# Patient Record
Sex: FEMALE | Race: WHITE | NOT HISPANIC OR LATINO | Employment: FULL TIME | URBAN - NONMETROPOLITAN AREA
[De-identification: names, ages, dates, MRNs, and addresses within clinical notes are randomized per-mention and may not be internally consistent; named-entity substitution may affect disease eponyms.]

---

## 2017-01-02 ENCOUNTER — OFFICE VISIT (OUTPATIENT)
Dept: URGENT CARE | Facility: PHYSICIAN GROUP | Age: 32
End: 2017-01-02
Payer: OTHER GOVERNMENT

## 2017-01-02 VITALS
HEART RATE: 91 BPM | RESPIRATION RATE: 20 BRPM | OXYGEN SATURATION: 97 % | BODY MASS INDEX: 35.32 KG/M2 | WEIGHT: 212 LBS | HEIGHT: 65 IN | DIASTOLIC BLOOD PRESSURE: 88 MMHG | TEMPERATURE: 98.8 F | SYSTOLIC BLOOD PRESSURE: 120 MMHG

## 2017-01-02 DIAGNOSIS — H61.23 BILATERAL IMPACTED CERUMEN: ICD-10-CM

## 2017-01-02 DIAGNOSIS — J02.9 PHARYNGITIS, UNSPECIFIED ETIOLOGY: ICD-10-CM

## 2017-01-02 DIAGNOSIS — Z20.818 EXPOSURE TO STREP THROAT: ICD-10-CM

## 2017-01-02 LAB
INT CON NEG: NORMAL
INT CON POS: NORMAL
S PYO AG THROAT QL: NORMAL

## 2017-01-02 PROCEDURE — 69210 REMOVE IMPACTED EAR WAX UNI: CPT | Mod: 50 | Performed by: PHYSICIAN ASSISTANT

## 2017-01-02 PROCEDURE — 87880 STREP A ASSAY W/OPTIC: CPT | Performed by: PHYSICIAN ASSISTANT

## 2017-01-02 PROCEDURE — 99203 OFFICE O/P NEW LOW 30 MIN: CPT | Mod: 25 | Performed by: PHYSICIAN ASSISTANT

## 2017-01-02 RX ORDER — AMOXICILLIN 500 MG/1
500 CAPSULE ORAL 2 TIMES DAILY
Qty: 20 CAP | Refills: 0 | Status: SHIPPED | OUTPATIENT
Start: 2017-01-02 | End: 2017-01-12

## 2017-01-02 ASSESSMENT — ENCOUNTER SYMPTOMS
VOMITING: 0
COUGH: 0
SWOLLEN GLANDS: 1
CHILLS: 0
FEVER: 0
SORE THROAT: 1
SHORTNESS OF BREATH: 0
TROUBLE SWALLOWING: 0
STRIDOR: 0

## 2017-01-02 NOTE — PROGRESS NOTES
Subjective:      Reena Chester is a 31 y.o. female who presents with Pharyngitis            HPI Comments: Patient reports that she was recently exposed to strep and woke up this morning with a sore throat which has been worsening. She is traveling and is concerned that she may have strep. She would like to be checked today.    Pharyngitis   This is a new problem. The current episode started today. The problem has been gradually worsening. Neither side of throat is experiencing more pain than the other. There has been no fever. The pain is mild. Associated symptoms include swollen glands. Pertinent negatives include no congestion, coughing, ear discharge, ear pain, shortness of breath, stridor, trouble swallowing or vomiting. She has had exposure to strep. She has had no exposure to mono. She has tried nothing for the symptoms. The treatment provided no relief.       Review of Systems   Constitutional: Negative for fever and chills.   HENT: Positive for sore throat. Negative for congestion, ear discharge, ear pain and trouble swallowing.    Respiratory: Negative for cough, shortness of breath and stridor.    Gastrointestinal: Negative for vomiting.   Skin: Negative for rash.     Allergies:Review of patient's allergies indicates no known allergies.    Current Outpatient Prescriptions Ordered in [facility]   Medication Sig Dispense Refill   • amoxicillin (AMOXIL) 500 MG Cap Take 1 Cap by mouth 2 times a day for 10 days. 20 Cap 0   • Levothyroxine Sodium (SYNTHROID PO) Take  by mouth.       No current Epic-ordered facility-administered medications on file.       History reviewed. No pertinent past medical history.    Social History   Substance Use Topics   • Smoking status: Never Smoker    • Smokeless tobacco: Never Used   • Alcohol Use: Yes      Comment: rare       No family status information on file.   History reviewed. No pertinent family history.         Objective:     /88 mmHg  Pulse 91  Temp(Src) 37.1 °C (98.8  "°F)  Resp 20  Ht 1.651 m (5' 5\")  Wt 96.163 kg (212 lb)  BMI 35.28 kg/m2  SpO2 97%  Breastfeeding? No     Physical Exam   Constitutional: She is oriented to person, place, and time. She appears well-developed and well-nourished. No distress.   HENT:   Head: Normocephalic and atraumatic.   Right Ear: External ear normal.   Left Ear: External ear normal.   Canals completely obstructed by cerumen. After removal, canals and tympanic membranes unremarkable. Posterior pharynx mildly erythematous without edema or exudate   Eyes: Right eye exhibits no discharge. Left eye exhibits no discharge.   Neck: Normal range of motion. Neck supple.   Cardiovascular: Normal rate and regular rhythm.    Pulmonary/Chest: Effort normal and breath sounds normal.   Lymphadenopathy:     She has cervical adenopathy (anterior).   Neurological: She is alert and oriented to person, place, and time.   Skin: Skin is warm and dry. She is not diaphoretic.   Psychiatric: She has a normal mood and affect. Her behavior is normal. Judgment and thought content normal.   Nursing note and vitals reviewed.    Labs: Rapid strep negative          Assessment/Plan:     1. Pharyngitis, unspecified etiology  POCT Rapid Strep A    amoxicillin (AMOXIL) 500 MG Cap    Started today. Erythema without edema or exudate. Anterior cervical lymphadenopathy. Rapid short negative. Given contingent amoxicillin if worsening.   2. Exposure to strep throat  POCT Rapid Strep A    amoxicillin (AMOXIL) 500 MG Cap    Recent exposure to strep. Rapid strep negative. Given contingent amoxicillin if symptoms worsen   3. Bilateral impacted cerumen      Canals cleared in clinic. Given written instructions. Follow-up as needed     Procedure: Cerumen Removal  Risks and benefits of procedure discussed  Cerumen removed with curette and lavage after softening agent instilled  Patient tolerated well  Post procedure exam with clear canal and normal TM      Elsevier Interactive Patient " Education given: Pharyngitis, cerumen impaction    Please note that this dictation was created using voice recognition software. I have made every reasonable attempt to correct obvious errors, but I expect that there are errors of grammar and possibly content that I did not discover before finalizing the note.

## 2017-01-02 NOTE — MR AVS SNAPSHOT
"        Reena Chester   2017 10:50 AM   Office Visit   MRN: [id number]    Department:  [facility]   Dept Phone:  [phone]    Description:  Female : 1985   Provider:  ADELA Mcdonald           Reason for Visit     Pharyngitis x1day runny nose      Allergies as of 2017     No Known Allergies      You were diagnosed with     Pharyngitis, unspecified etiology   [6648173]   Started today. Erythema without edema or exudate. Anterior cervical lymphadenopathy. Rapid short negative. Given contingent amoxicillin if worsening.    Exposure to strep throat   [099035]   Recent exposure to strep. Rapid strep negative. Given contingent amoxicillin if symptoms worsen    Bilateral impacted cerumen   [654812]   Canals cleared in clinic. Given written instructions. Follow-up as needed      Vital Signs     Blood Pressure Pulse Temperature Respirations Height Weight    120/88 mmHg 91 37.1 °C (98.8 °F) 20 1.651 m (5' 5\") 96.163 kg (212 lb)    Body Mass Index Oxygen Saturation Breastfeeding? Smoking Status          35.28 kg/m2 97% No Never Smoker         Basic Information     Date Of Birth Sex Race Ethnicity Preferred Language    1985 Female White Non- English      Health Maintenance     Patient has no pending health maintenance at this time      Results     POCT Rapid Strep A      Component    Rapid Strep Screen    neg    Internal Control Positive    Valid    Internal Control Negative    Valid                        Current Immunizations     No immunizations on file.      Below and/or attached are the medications your provider expects you to take. Review all of your home medications and newly ordered medications with your provider and/or pharmacist. Follow medication instructions as directed by your provider and/or pharmacist. Please keep your medication list with you and share with your provider. Update the information when medications are discontinued, doses are changed, or new medications " (including over-the-counter products) are added; and carry medication information at all times in the event of emergency situations     Allergies:  No Known Allergies          Medications  Valid as of: January 02, 2017 - 11:55 AM    Generic Name Brand Name Tablet Size Instructions for use    Amoxicillin (Cap) AMOXIL 500 MG Take 1 Cap by mouth 2 times a day for 10 days.        Levothyroxine Sodium   Take  by mouth.        .                 Medicines prescribed today were sent to:     [facility] [id number] - [place], [place] - [address] [facility] [address]    [address] [place] [address]    Phone: [phone] Fax: [phone]    Open 24 Hours?: No      Medication refill instructions:       If your prescription bottle indicates you have medication refills left, it is not necessary to call your provider’s office. Please contact your pharmacy and they will refill your medication.    If your prescription bottle indicates you do not have any refills left, you may request refills at any time through one of the following ways: The online Moove In system (except Urgent Care), by calling your provider’s office, or by asking your pharmacy to contact your provider’s office with a refill request. Medication refills are processed only during regular business hours and may not be available until the next business day. Your provider may request additional information or to have a follow-up visit with you prior to refilling your medication.   *Please Note: Medication refills are assigned a new Rx number when refilled electronically. Your pharmacy may indicate that no refills were authorized even though a new prescription for the same medication is available at the pharmacy. Please request the medicine by name with the pharmacy before contacting your provider for a refill.        Instructions      Pharyngitis  Pharyngitis is redness, pain, and swelling (inflammation) of your pharynx.   CAUSES   Pharyngitis is usually  caused by infection. Most of the time, these infections are from viruses (viral) and are part of a cold. However, sometimes pharyngitis is caused by bacteria (bacterial). Pharyngitis can also be caused by allergies. Viral pharyngitis may be spread from person to person by coughing, sneezing, and personal items or utensils (cups, forks, spoons, toothbrushes). Bacterial pharyngitis may be spread from person to person by more intimate contact, such as kissing.   SIGNS AND SYMPTOMS   Symptoms of pharyngitis include:    · Sore throat.    · Tiredness (fatigue).    · Low-grade fever.    · Headache.  · Joint pain and muscle aches.  · Skin rashes.  · Swollen lymph nodes.  · Plaque-like film on throat or tonsils (often seen with bacterial pharyngitis).  DIAGNOSIS   Your health care provider will ask you questions about your illness and your symptoms. Your medical history, along with a physical exam, is often all that is needed to diagnose pharyngitis. Sometimes, a rapid strep test is done. Other lab tests may also be done, depending on the suspected cause.   TREATMENT   Viral pharyngitis will usually get better in 3-4 days without the use of medicine. Bacterial pharyngitis is treated with medicines that kill germs (antibiotics).   HOME CARE INSTRUCTIONS   · Drink enough water and fluids to keep your urine clear or pale yellow.    · Only take over-the-counter or prescription medicines as directed by your health care provider:    ¨ If you are prescribed antibiotics, make sure you finish them even if you start to feel better.    ¨ Do not take aspirin.    · Get lots of rest.    · Gargle with 8 oz of salt water (½ tsp of salt per 1 qt of water) as often as every 1-2 hours to soothe your throat.    · Throat lozenges (if you are not at risk for choking) or sprays may be used to soothe your throat.  SEEK MEDICAL CARE IF:   · You have large, tender lumps in your neck.  · You have a rash.  · You cough up green, yellow-brown, or bloody  spit.  SEEK IMMEDIATE MEDICAL CARE IF:   · Your neck becomes stiff.  · You drool or are unable to swallow liquids.  · You vomit or are unable to keep medicines or liquids down.  · You have severe pain that does not go away with the use of recommended medicines.  · You have trouble breathing (not caused by a stuffy nose).  MAKE SURE YOU:   · Understand these instructions.  · Will watch your condition.  · Will get help right away if you are not doing well or get worse.     This information is not intended to replace advice given to you by your health care provider. Make sure you discuss any questions you have with your health care provider.     Document Released: 12/18/2006 Document Revised: 10/08/2014 Document Reviewed: 08/25/2014  Aspectiva Interactive Patient Education ©2016 Aspectiva Inc.      Cerumen Impaction  The structures of the external ear canal secrete a waxy substance known as cerumen. Excess cerumen can build up in the ear canal, causing a condition known as cerumen impaction. Cerumen impaction can cause ear pain and disrupt the function of the ear.  The rate of cerumen production differs for each individual. In certain individuals, the configuration of the ear canal may decrease his or her ability to naturally remove cerumen.  CAUSES  Cerumen impaction is caused by excessive cerumen production or buildup.  RISK FACTORS  · Frequent use of swabs to clean ears.  · Having narrow ear canals.  · Having eczema.  · Being dehydrated.  SIGNS AND SYMPTOMS  · Diminished hearing.  · Ear drainage.  · Ear pain.  · Ear itch.  TREATMENT  Treatment may involve:  · Over-the-counter or prescription ear drops to soften the cerumen.  · Removal of cerumen by a health care provider. This may be done with:  · Irrigation with warm water. This is the most common method of removal.  · Ear curettes and other instruments.  · Surgery. This may be done in severe cases.  HOME CARE INSTRUCTIONS  · Take medicines only as directed by your  health care provider.  · Do not insert objects into the ear with the intent of cleaning the ear.  PREVENTION  · Do not insert objects into the ear, even with the intent of cleaning the ear. Removing cerumen as a part of normal hygiene is not necessary, and the use of swabs in the ear canal is not recommended.  · Drink enough water to keep your urine clear or pale yellow.  · Control your eczema if you have it.  SEEK MEDICAL CARE IF:  · You develop ear pain.  · You develop bleeding from the ear.  · The cerumen does not clear after you use ear drops as directed.     This information is not intended to replace advice given to you by your health care provider. Make sure you discuss any questions you have with your health care provider.     Document Released: 01/25/2006 Document Revised: 01/08/2016 Document Reviewed: 03/17/2011  Medmonk Interactive Patient Education ©2016 Elsevier Inc.            LiveQoS Access Code: [email or web address]  Expires: 2/1/2017 11:55 AM    Your email address is not on file at [facility].  Email Addresses are required for you to sign up for LiveQoS, please contact [phone] to verify your personal information and to provide your email address prior to attempting to register for LiveQoS.    Reena Chester  [address] [place] [id number]    LiveQoS  A secure, online tool to manage your health information     [facility]’s LiveQoS® is a secure, online tool that connects you to your personalized health information from the privacy of your home -- day or night - making it very easy for you to manage your healthcare. Once the activation process is completed, you can even access your medical information using the LiveQoS hernan, which is available for free in the Apple Hernan store or Google Play store.     To learn more about LiveQoS, visit [email or web address]    There are two levels of access available (as shown below):   My Chart Features  [facility] Primary Care Doctor  [facility]  Specialists [facility]  Urgent  Care [facility] Primary Care Doctor   Email your healthcare team securely and privately 24/7 X X X    Manage appointments: schedule your next appointment; view details of past/upcoming appointments X      Request prescription refills. X      View recent personal medical records, including lab and immunizations X X X X   View health record, including health history, allergies, medications X X X X   Read reports about your outpatient visits, procedures, consult and ER notes X X X X   See your discharge summary, which is a recap of your hospital and/or ER visit that includes your diagnosis, lab results, and care plan X X  X     How to register for AppTap:  Once your e-mail address has been verified, follow the following steps to sign up for AppTap.     1. Go to  [email or web address]  2. Click on the Sign Up Now box, which takes you to the New Member Sign Up page. You will need to provide the following information:  a. Enter your AppTap Access Code exactly as it appears at the top of this page. (You will not need to use this code after you’ve completed the sign-up process. If you do not sign up before the expiration date, you must request a new code.)   b. Enter your date of birth.   c. Enter your home email address.   d. Click Submit, and follow the next screen’s instructions.  3. Create a AppTap ID. This will be your AppTap login ID and cannot be changed, so think of one that is secure and easy to remember.  4. Create a AppTap password. You can change your password at any time.  5. Enter your Password Reset Question and Answer. This can be used at a later time if you forget your password.   6. Enter your e-mail address. This allows you to receive e-mail notifications when new information is available in AppTap.  7. Click Sign Up. You can now view your health information.    For assistance activating your AppTap account, call [phone]

## 2017-01-16 ENCOUNTER — OFFICE VISIT (OUTPATIENT)
Dept: CARDIOLOGY | Facility: MEDICAL CENTER | Age: 32
End: 2017-01-16
Payer: OTHER GOVERNMENT

## 2017-01-16 VITALS
BODY MASS INDEX: 33.2 KG/M2 | DIASTOLIC BLOOD PRESSURE: 74 MMHG | SYSTOLIC BLOOD PRESSURE: 126 MMHG | WEIGHT: 212 LBS | OXYGEN SATURATION: 100 % | HEART RATE: 78 BPM

## 2017-01-16 DIAGNOSIS — R42 DIZZINESS: ICD-10-CM

## 2017-01-16 DIAGNOSIS — I48.4 ATYPICAL ATRIAL FLUTTER (HCC): ICD-10-CM

## 2017-01-16 PROCEDURE — 99214 OFFICE O/P EST MOD 30 MIN: CPT | Performed by: INTERNAL MEDICINE

## 2017-01-16 NOTE — MR AVS SNAPSHOT
Reena Chester   2017 9:40 AM   Office Visit   MRN: [id number]    Department:  [facility]   Dept Phone:  [phone]    Description:  Female : 1985   Provider:  Arturo Zhang M.D.           Reason for Visit     Follow-Up pt states she is dizzy with planking or up/down movement      Allergies as of 2017     No Known Allergies      You were diagnosed with     Dizziness   [520278]       Atypical atrial flutter (CMS-HCC)   [301868]         Vital Signs     Blood Pressure Pulse Weight Oxygen Saturation Smoking Status       126/74 mmHg 78 96.163 kg (212 lb) 100% Never Smoker        Basic Information     Date Of Birth Sex Race Ethnicity Preferred Language    1985 Female White Non- English      Your appointments     2017  9:30 AM   ZIOPATCH with [facility]   [facility] (--)    [address]  [place] [address] [phone]            2017  9:40 AM   FOLLOW UP with Arturo Zhang M.D.   [facility] (--)    [address]  [place] [address]   [phone]              Problem List              ICD-10-CM Priority Class Noted - Resolved    Hypothyroidism E03.9 Medium  2016 - Present    Atrial fibrillation and flutter (CMS-HCC) I48.91, I48.92   2016 - Present      Health Maintenance        Date Due Completion Dates    IMM DTaP/Tdap/Td Vaccine (1 - Tdap) 3/23/2004 ---    PAP SMEAR 3/23/2006 ---    IMM INFLUENZA (1) 2015            Results       Current Immunizations     Influenza Vaccine Quad Inj (Pf) 2015      Below and/or attached are the medications your provider expects you to take. Review all of your home medications and newly ordered medications with your provider and/or pharmacist. Follow medication instructions as directed by your provider and/or pharmacist. Please keep your medication list with you and share with your provider.  Update the information when medications are discontinued, doses are changed, or new medications (including over-the-counter products) are added; and carry medication information at all times in the event of emergency situations     Allergies:  No Known Allergies          Medications  Valid as of: January 16, 2017 - 10:10 AM    Generic Name Brand Name Tablet Size Instructions for use    Hydrocodone-Acetaminophen (Tab) NORCO 5-325 MG Take 1-2 Tabs by mouth every 6 hours as needed ((Pain Scale 4-6)).        Levothyroxine Sodium (Tab) SYNTHROID 25 MCG Take 1 Tab by mouth Every morning on an empty stomach.        Rivaroxaban (Tab) XARELTO 20 MG Take 1 Tab by mouth with dinner.        .                 Medicines prescribed today were sent to:     [facility] [address]    [address]  [place] [address]    Phone: [phone] Fax: [phone]    Open 24 Hours?: No    [id number] - [place] - [address]    [address] [place] [address]    Phone: [phone] Fax: [phone]    Open 24 Hours?: No    [facility] - [place] [address] [facility] [address]    Phone: [phone] Fax: [phone]    Open 24 Hours?: No      Medication refill instructions:       If your prescription bottle indicates you have medication refills left, it is not necessary to call your provider’s office. Please contact your pharmacy and they will refill your medication.    If your prescription bottle indicates you do not have any refills left, you may request refills at any time through one of the following ways: The online Sitestar system (except Urgent Care), by calling your provider’s office, or by asking your pharmacy to contact your provider’s office with a refill request. Medication refills are processed only during regular business hours and may not be available until the next business day. Your provider may request additional  information or to have a follow-up visit with you prior to refilling your medication.   *Please Note: Medication refills are assigned a new Rx number when refilled electronically. Your pharmacy may indicate that no refills were authorized even though a new prescription for the same medication is available at the pharmacy. Please request the medicine by name with the pharmacy before contacting your provider for a refill.        Your To Do List     Future Labs/Procedures Complete By Expires    ZIO PATCH MONITOR  As directed 1/16/2018         WideAngle Metrics Access Code: [email or web address]  Expires: 2/15/2017  9:46 AM    WideAngle Metrics  A secure, online tool to manage your health information     [facility]’s WideAngle Metrics® is a secure, online tool that connects you to your personalized health information from the privacy of your home -- day or night - making it very easy for you to manage your healthcare. Once the activation process is completed, you can even access your medical information using the WideAngle Metrics hernan, which is available for free in the Apple Hernan store or Google Play store.     WideAngle Metrics provides the following levels of access (as shown below):   My Chart Features   [facility] Primary Care Doctor [facility]  Specialists [facility]  Urgent  Care [facility]  Primary Care  Doctor   Email your healthcare team securely and privately 24/7 X X X    Manage appointments: schedule your next appointment; view details of past/upcoming appointments X      Request prescription refills. X      View recent personal medical records, including lab and immunizations X X X X   View health record, including health history, allergies, medications X X X X   Read reports about your outpatient visits, procedures, consult and ER notes X X X X   See your discharge summary, which is a recap of your hospital and/or ER visit that includes your diagnosis, lab results, and care plan. X X       How to register for WideAngle Metrics:  1. Go to  [email or web address].  2. Click  on the Sign Up Now box, which takes you to the New Member Sign Up page. You will need to provide the following information:  a. Enter your Impliant Access Code exactly as it appears at the top of this page. (You will not need to use this code after you’ve completed the sign-up process. If you do not sign up before the expiration date, you must request a new code.)   b. Enter your date of birth.   c. Enter your home email address.   d. Click Submit, and follow the next screen’s instructions.  3. Create a Impliant ID. This will be your Impliant login ID and cannot be changed, so think of one that is secure and easy to remember.  4. Create a Impliant password. You can change your password at any time.  5. Enter your Password Reset Question and Answer. This can be used at a later time if you forget your password.   6. Enter your e-mail address. This allows you to receive e-mail notifications when new information is available in Impliant.  7. Click Sign Up. You can now view your health information.    For assistance activating your Impliant account, call [phone]

## 2017-01-16 NOTE — PROGRESS NOTES
Arrhythmia Clinic Note (Established patient)    DOS: 1/16/2017    Chief complaint/Reason for consult: f/u SVT    Interval History:  Patient is a 32 yo female with history of SVT s/p ablation with me here for follow-up. She had idiopathic RA scar and atypical flutter due to this. She underwent successful atypical R sided flutter ablation and has been doing well since. She does say she gets lightheaded and dizzy when she exercises doing burpees and planks. This is not associated with chest discomfort or palpitations. She is worried that this might represent recurrent tachycardia    ROS (+ highlighted in red):  Constitutional: Fevers/chills/fatigue/weightloss  Respiratory: Shortness of breath/cough  Cardiovascular: Chest pain/palpitations/edema/orthopnea/syncope    Past Medical History   Diagnosis Date   • Polycystic ovarian syndrome 09/13/2006       No Known Allergies    Current Outpatient Prescriptions   Medication Sig Dispense Refill   • levothyroxine (SYNTHROID) 25 MCG Tab Take 1 Tab by mouth Every morning on an empty stomach. 30 Tab 0   • rivaroxaban (XARELTO) 20 MG Tab tablet Take 1 Tab by mouth with dinner. (Patient not taking: Reported on 1/16/2017) 90 Tab 0   • hydrocodone-acetaminophen (NORCO) 5-325 MG Tab per tablet Take 1-2 Tabs by mouth every 6 hours as needed ((Pain Scale 4-6)). (Patient not taking: Reported on 1/16/2017) 20 Tab 0     No current facility-administered medications for this visit.       Physical Exam:  Filed Vitals:    01/16/17 0945   BP: 126/74   Pulse: 78   Weight: 96.163 kg (212 lb)   SpO2: 100%     General appearance: NAD, conversant   Eyes: anicteric sclerae, moist conjunctivae; no lid-lag; PERRLA  HENT: Atraumatic; oropharynx clear with moist mucous membranes and no mucosal ulcerations; normal hard and soft palate  Neck: Trachea midline; FROM, supple, no thyromegaly or lymphadenopathy  Lungs: CTA, with normal respiratory effort and no intercostal retractions  CV: RRR, no MRGs, no  JVD  Abdomen: Soft, non-tender; no masses or HSM  Extremities: No peripheral edema or extremity lymphadenopathy  Skin: Normal temperature, turgor and texture; no rash, ulcers or subcutaneous nodules  Psych: Appropriate affect, alert and oriented to person, place and time    Data:    EKG interpreted by me:  NSR    Impression/Plan:  1. Dizziness  ZIO PATCH MONITOR   2. Atypical atrial flutter (CMS-HCC)  ZIO PATCH MONITOR     -She is not sure whether she is getting recurrent SVT  -I am also unsure, but I do think the sx are consistent with her prior sustained flutters  -We can put her on outpatient monitoring to find out  -F/u after monitor complete     Arturo Zhang MD

## 2017-01-18 LAB — EKG IMPRESSION: NORMAL

## 2017-02-01 ENCOUNTER — TELEPHONE (OUTPATIENT)
Dept: CARDIOLOGY | Facility: MEDICAL CENTER | Age: 32
End: 2017-02-01

## 2017-02-01 ENCOUNTER — NON-PROVIDER VISIT (OUTPATIENT)
Dept: CARDIOLOGY | Facility: MEDICAL CENTER | Age: 32
End: 2017-02-01
Attending: INTERNAL MEDICINE
Payer: OTHER GOVERNMENT

## 2017-02-01 DIAGNOSIS — I48.91 ATRIAL FIBRILLATION AND FLUTTER (HCC): ICD-10-CM

## 2017-02-01 DIAGNOSIS — R42 DIZZINESS: ICD-10-CM

## 2017-02-01 DIAGNOSIS — I48.92 ATRIAL FIBRILLATION AND FLUTTER (HCC): ICD-10-CM

## 2017-02-01 DIAGNOSIS — I48.4 ATYPICAL ATRIAL FLUTTER (HCC): ICD-10-CM

## 2017-02-27 ENCOUNTER — OFFICE VISIT (OUTPATIENT)
Dept: CARDIOLOGY | Facility: MEDICAL CENTER | Age: 32
End: 2017-02-27
Payer: OTHER GOVERNMENT

## 2017-02-27 VITALS
HEIGHT: 67 IN | HEART RATE: 73 BPM | DIASTOLIC BLOOD PRESSURE: 72 MMHG | BODY MASS INDEX: 33.27 KG/M2 | OXYGEN SATURATION: 100 % | WEIGHT: 212 LBS | SYSTOLIC BLOOD PRESSURE: 142 MMHG

## 2017-02-27 DIAGNOSIS — Z98.890 STATUS POST ABLATION OF ATRIAL FLUTTER: ICD-10-CM

## 2017-02-27 DIAGNOSIS — Z86.79 STATUS POST ABLATION OF ATRIAL FLUTTER: ICD-10-CM

## 2017-02-27 DIAGNOSIS — I48.4 ATYPICAL ATRIAL FLUTTER (HCC): Primary | ICD-10-CM

## 2017-02-27 DIAGNOSIS — R42 DIZZINESS: ICD-10-CM

## 2017-02-27 PROCEDURE — 0298T PR EXT ECG > 48HR TO 21 DAY REVIEW AND INTERPRETATN: CPT | Performed by: INTERNAL MEDICINE

## 2017-02-27 PROCEDURE — 93000 ELECTROCARDIOGRAM COMPLETE: CPT | Performed by: INTERNAL MEDICINE

## 2017-02-27 PROCEDURE — 0296T PR EXT ECG > 48HR TO 21 DAY RCRD W/CONECT INTL RCRD: CPT | Performed by: INTERNAL MEDICINE

## 2017-02-27 PROCEDURE — 99214 OFFICE O/P EST MOD 30 MIN: CPT | Performed by: INTERNAL MEDICINE

## 2017-02-27 NOTE — PROGRESS NOTES
"Arrhythmia Clinic Note (Established patient)    DOS: 2/27/2016    Chief complaint/Reason for consult: F/u flutter    Interval History:  Patient is a 32 yo female with history of atypical flutter s/p R atrial flutter ablation (not CTI dependent, though I did the CTI line during the case anyways). Here for follow-up after she experienced some dizziness during exercise and underwent a Zio monitor which was unremarkable for recurrent arrhythmia.    ROS (+ highlighted in red):  Constitutional: Fevers/chills/fatigue/weightloss  Respiratory: Shortness of breath/cough  Cardiovascular: Chest pain/palpitations/edema/orthopnea/syncope    Past Medical History   Diagnosis Date   • Polycystic ovarian syndrome 09/13/2006       No Known Allergies    Current Outpatient Prescriptions   Medication Sig Dispense Refill   • rivaroxaban (XARELTO) 20 MG Tab tablet Take 1 Tab by mouth with dinner. (Patient not taking: Reported on 1/16/2017) 90 Tab 0   • hydrocodone-acetaminophen (NORCO) 5-325 MG Tab per tablet Take 1-2 Tabs by mouth every 6 hours as needed ((Pain Scale 4-6)). (Patient not taking: Reported on 2/27/2017) 20 Tab 0   • levothyroxine (SYNTHROID) 25 MCG Tab Take 1 Tab by mouth Every morning on an empty stomach. 30 Tab 0     No current facility-administered medications for this visit.       Physical Exam:  Filed Vitals:    02/27/17 0948   BP: 142/72   Pulse: 73   Height: 1.702 m (5' 7.01\")   Weight: 96.163 kg (212 lb)   SpO2: 100%     General appearance: NAD, conversant   Eyes: anicteric sclerae, moist conjunctivae; no lid-lag; PERRLA  HENT: Atraumatic; oropharynx clear with moist mucous membranes and no mucosal ulcerations; normal hard and soft palate  Neck: Trachea midline; FROM, supple, no thyromegaly or lymphadenopathy  Lungs: CTA, with normal respiratory effort and no intercostal retractions  CV: RRR, no MRGs, no JVD  Abdomen: Soft, non-tender; no masses or HSM  Extremities: No peripheral edema or extremity " lymphadenopathy  Skin: Normal temperature, turgor and texture; no rash, ulcers or subcutaneous nodules  Psych: Appropriate affect, alert and oriented to person, place and time    Data:  EKG interpreted by me:  Sinus    Zio patch reviewed, no recurrence of flutter, otherwise unremarkable     Impression/Plan:  1. Atypical atrial flutter (CMS-HCC)     2. Dizziness     3. Status post ablation of atrial flutter       -No further flutter  -Her dizziness is mainly when she does burpees, i told her to slow down while doing this  -I do not think it is arrhythmia related  -She is moving to [facility], I do not think she needs further EP follow-up at this point, if she has arrhythmia recurrence we can arrange for referral to EP there    Arturo Zhang MD

## 2017-02-27 NOTE — MR AVS SNAPSHOT
"        Reena Chester   2017 9:40 AM   Office Visit   MRN: [id number]    Department:  [facility]   Dept Phone:  [phone]    Description:  Female : 1985   Provider:  Arturo Zhang M.D.           Reason for Visit     Follow-Up           Allergies as of 2017     No Known Allergies      You were diagnosed with     Atrial fibrillation and flutter (CMS-HCC)   [[id number]]         Vital Signs     Blood Pressure Pulse Height Weight Body Mass Index Oxygen Saturation    142/72 mmHg 73 1.702 m (5' 7.01\") 96.163 kg (212 lb) 33.20 kg/m2 100%    Smoking Status                   Never Smoker            Basic Information     Date Of Birth Sex Race Ethnicity Preferred Language    1985 Female White Non- English      Problem List              ICD-10-CM Priority Class Noted - Resolved    Hypothyroidism E03.9 Medium  2016 - Present    Atrial fibrillation and flutter (CMS-HCC) I48.91, I48.92   2016 - Present      Health Maintenance        Date Due Completion Dates    IMM DTaP/Tdap/Td Vaccine (1 - Tdap) 3/23/2004 ---    PAP SMEAR 3/23/2006 ---    IMM INFLUENZA (1) 2015            Results       Current Immunizations     Influenza Vaccine Quad Inj (Pf) 2015      Below and/or attached are the medications your provider expects you to take. Review all of your home medications and newly ordered medications with your provider and/or pharmacist. Follow medication instructions as directed by your provider and/or pharmacist. Please keep your medication list with you and share with your provider. Update the information when medications are discontinued, doses are changed, or new medications (including over-the-counter products) are added; and carry medication information at all times in the event of emergency situations     Allergies:  No Known Allergies          Medications  Valid as of: 2017 - 10:05 AM    Generic Name Brand Name Tablet Size Instructions for use   " Hydrocodone-Acetaminophen (Tab) NORCO 5-325 MG Take 1-2 Tabs by mouth every 6 hours as needed ((Pain Scale 4-6)).        Levothyroxine Sodium (Tab) SYNTHROID 25 MCG Take 1 Tab by mouth Every morning on an empty stomach.        Rivaroxaban (Tab) XARELTO 20 MG Take 1 Tab by mouth with dinner.        .                 Medicines prescribed today were sent to:     [facility] [place] [facility] - [place], [place] - [address]    [address] [place] [address]    Phone: [phone] Fax: [phone]    Open 24 Hours?: No    [id number] - [place], [place] - [address]    [address] [place] [address]    Phone: [phone] Fax: [phone]    Open 24 Hours?: No    [facility] - [place], [place] - [address] [place] [id number]    Phone: [phone] Fax: [phone]    Open 24 Hours?: No      Medication refill instructions:       If your prescription bottle indicates you have medication refills left, it is not necessary to call your provider’s office. Please contact your pharmacy and they will refill your medication.    If your prescription bottle indicates you do not have any refills left, you may request refills at any time through one of the following ways: The online [employer] system (except Urgent Care), by calling your provider’s office, or by asking your pharmacy to contact your provider’s office with a refill request. Medication refills are processed only during regular business hours and may not be available until the next business day. Your provider may request additional information or to have a follow-up visit with you prior to refilling your medication.   *Please Note: Medication refills are assigned a new Rx number when refilled electronically. Your pharmacy may indicate that no refills were authorized even though a new prescription for the same medication is available at the pharmacy. Please request the medicine by name with the pharmacy before  contacting your provider for a refill.           MyChart Access Code: Activation code not generated  Current MyChart Status: Active

## 2017-02-28 LAB — EKG IMPRESSION: NORMAL
